# Patient Record
Sex: MALE | Race: OTHER | HISPANIC OR LATINO | Employment: FULL TIME | URBAN - NONMETROPOLITAN AREA
[De-identification: names, ages, dates, MRNs, and addresses within clinical notes are randomized per-mention and may not be internally consistent; named-entity substitution may affect disease eponyms.]

---

## 2019-01-16 ENCOUNTER — APPOINTMENT (EMERGENCY)
Dept: CT IMAGING | Facility: HOSPITAL | Age: 34
End: 2019-01-16
Payer: COMMERCIAL

## 2019-01-16 ENCOUNTER — APPOINTMENT (EMERGENCY)
Dept: RADIOLOGY | Facility: HOSPITAL | Age: 34
End: 2019-01-16
Payer: COMMERCIAL

## 2019-01-16 ENCOUNTER — HOSPITAL ENCOUNTER (EMERGENCY)
Facility: HOSPITAL | Age: 34
Discharge: HOME | End: 2019-01-16
Attending: EMERGENCY MEDICINE
Payer: COMMERCIAL

## 2019-01-16 VITALS
RESPIRATION RATE: 18 BRPM | DIASTOLIC BLOOD PRESSURE: 74 MMHG | HEART RATE: 72 BPM | SYSTOLIC BLOOD PRESSURE: 124 MMHG | OXYGEN SATURATION: 97 % | TEMPERATURE: 99.2 F | WEIGHT: 205.25 LBS

## 2019-01-16 DIAGNOSIS — E87.6 HYPOKALEMIA: Primary | ICD-10-CM

## 2019-01-16 DIAGNOSIS — R20.2 PARESTHESIAS: ICD-10-CM

## 2019-01-16 LAB
ALBUMIN SERPL BCP-MCNC: 4 G/DL (ref 3.5–5)
ALP SERPL-CCNC: 138 U/L (ref 46–116)
ALT SERPL W P-5'-P-CCNC: 96 U/L (ref 12–78)
ANION GAP SERPL CALCULATED.3IONS-SCNC: 15 MMOL/L (ref 4–13)
AST SERPL W P-5'-P-CCNC: 56 U/L (ref 5–45)
BASOPHILS # BLD AUTO: 0.02 THOUSANDS/ΜL (ref 0–0.1)
BASOPHILS NFR BLD AUTO: 0 % (ref 0–1)
BILIRUB SERPL-MCNC: 1 MG/DL (ref 0.2–1)
BUN SERPL-MCNC: 12 MG/DL (ref 5–25)
CALCIUM SERPL-MCNC: 9 MG/DL (ref 8.3–10.1)
CHLORIDE SERPL-SCNC: 104 MMOL/L (ref 100–108)
CO2 SERPL-SCNC: 22 MMOL/L (ref 21–32)
CREAT SERPL-MCNC: 1.07 MG/DL (ref 0.6–1.3)
EOSINOPHIL # BLD AUTO: 0.11 THOUSAND/ΜL (ref 0–0.61)
EOSINOPHIL NFR BLD AUTO: 2 % (ref 0–6)
ERYTHROCYTE [DISTWIDTH] IN BLOOD BY AUTOMATED COUNT: 12.3 % (ref 11.6–15.1)
GAS + CO PNL BLDA: 1.4 % (ref 0–1.5)
GFR SERPL CREATININE-BSD FRML MDRD: 91 ML/MIN/1.73SQ M
GLUCOSE SERPL-MCNC: 134 MG/DL (ref 65–140)
HCT VFR BLD AUTO: 43.8 % (ref 36.5–49.3)
HGB BLD-MCNC: 15.4 G/DL (ref 12–17)
IMM GRANULOCYTES # BLD AUTO: 0.02 THOUSAND/UL (ref 0–0.2)
IMM GRANULOCYTES NFR BLD AUTO: 0 % (ref 0–2)
LYMPHOCYTES # BLD AUTO: 2.1 THOUSANDS/ΜL (ref 0.6–4.47)
LYMPHOCYTES NFR BLD AUTO: 36 % (ref 14–44)
MAGNESIUM SERPL-MCNC: 1.9 MG/DL (ref 1.6–2.6)
MCH RBC QN AUTO: 30.3 PG (ref 26.8–34.3)
MCHC RBC AUTO-ENTMCNC: 35.2 G/DL (ref 31.4–37.4)
MCV RBC AUTO: 86 FL (ref 82–98)
MONOCYTES # BLD AUTO: 0.32 THOUSAND/ΜL (ref 0.17–1.22)
MONOCYTES NFR BLD AUTO: 6 % (ref 4–12)
NEUTROPHILS # BLD AUTO: 3.22 THOUSANDS/ΜL (ref 1.85–7.62)
NEUTS SEG NFR BLD AUTO: 56 % (ref 43–75)
NRBC BLD AUTO-RTO: 0 /100 WBCS
PLATELET # BLD AUTO: 249 THOUSANDS/UL (ref 149–390)
PMV BLD AUTO: 9.7 FL (ref 8.9–12.7)
POTASSIUM SERPL-SCNC: 3.2 MMOL/L (ref 3.5–5.3)
PROT SERPL-MCNC: 7.7 G/DL (ref 6.4–8.2)
RBC # BLD AUTO: 5.08 MILLION/UL (ref 3.88–5.62)
SODIUM SERPL-SCNC: 141 MMOL/L (ref 136–145)
TROPONIN I SERPL-MCNC: <0.02 NG/ML
TSH SERPL DL<=0.05 MIU/L-ACNC: 0.84 UIU/ML (ref 0.36–3.74)
WBC # BLD AUTO: 5.79 THOUSAND/UL (ref 4.31–10.16)

## 2019-01-16 PROCEDURE — 71045 X-RAY EXAM CHEST 1 VIEW: CPT

## 2019-01-16 PROCEDURE — 83735 ASSAY OF MAGNESIUM: CPT | Performed by: PHYSICIAN ASSISTANT

## 2019-01-16 PROCEDURE — 85025 COMPLETE CBC W/AUTO DIFF WBC: CPT | Performed by: PHYSICIAN ASSISTANT

## 2019-01-16 PROCEDURE — 96360 HYDRATION IV INFUSION INIT: CPT

## 2019-01-16 PROCEDURE — 82375 ASSAY CARBOXYHB QUANT: CPT | Performed by: PHYSICIAN ASSISTANT

## 2019-01-16 PROCEDURE — 93005 ELECTROCARDIOGRAM TRACING: CPT

## 2019-01-16 PROCEDURE — 70450 CT HEAD/BRAIN W/O DYE: CPT

## 2019-01-16 PROCEDURE — 84484 ASSAY OF TROPONIN QUANT: CPT | Performed by: PHYSICIAN ASSISTANT

## 2019-01-16 PROCEDURE — 36415 COLL VENOUS BLD VENIPUNCTURE: CPT | Performed by: PHYSICIAN ASSISTANT

## 2019-01-16 PROCEDURE — 84443 ASSAY THYROID STIM HORMONE: CPT | Performed by: PHYSICIAN ASSISTANT

## 2019-01-16 PROCEDURE — 80053 COMPREHEN METABOLIC PANEL: CPT | Performed by: PHYSICIAN ASSISTANT

## 2019-01-16 PROCEDURE — 99285 EMERGENCY DEPT VISIT HI MDM: CPT

## 2019-01-16 RX ORDER — POTASSIUM CHLORIDE 20 MEQ/1
40 TABLET, EXTENDED RELEASE ORAL ONCE
Status: COMPLETED | OUTPATIENT
Start: 2019-01-16 | End: 2019-01-16

## 2019-01-16 RX ADMIN — POTASSIUM CHLORIDE 40 MEQ: 1500 TABLET, EXTENDED RELEASE ORAL at 20:26

## 2019-01-16 RX ADMIN — SODIUM CHLORIDE 1000 ML: 0.9 INJECTION, SOLUTION INTRAVENOUS at 18:36

## 2019-01-16 NOTE — ED PROCEDURE NOTE
Procedure  ECG 12 Lead Documentation  Date/Time: 1/16/2019 6:56 PM  Performed by: Sandra Mann by: Regla Kirby     Indications / Diagnosis:  Numbness  ECG reviewed by me, the ED Provider: yes    Patient location:  ED  Previous ECG:     Previous ECG:  Unavailable    Comparison to cardiac monitor: Yes    Interpretation:     Interpretation: non-specific    Rate:     ECG rate:  72    ECG rate assessment: normal    Rhythm:     Rhythm: sinus rhythm    Ectopy:     Ectopy: none    QRS:     QRS axis:  Normal    QRS intervals:  Normal  Conduction:     Conduction: normal    ST segments:     ST segments:  Normal                     Kenan Cruz PA-C  01/16/19 1857

## 2019-01-16 NOTE — ED PROVIDER NOTES
History  Chief Complaint   Patient presents with    Numbness     Patient states numbness in both arms since he tried Meth for the first time on Sunday 1/13/19  Patient presents to the emergency department today [facility]  He is alert orient x4  Chief complaint is bilateral upper extremity numbness and tingling into the left chest   Also complains of some left leg numbness and tingling but none on the right  Other than that he denies any symptomatology  He states the symptoms have been present ever since he utilized intranasal methamphetamines for the 1st time ever 3 days ago  No history of headache  No blurred vision or double vision  No chest pain or shortness of breath  Denies use of any other illicit drugs  None       History reviewed  No pertinent past medical history  History reviewed  No pertinent surgical history  History reviewed  No pertinent family history  I have reviewed and agree with the history as documented  Social History   Substance Use Topics    Smoking status: Former Smoker    Smokeless tobacco: Never Used    Alcohol use Yes      Comment: Weekends        Review of Systems   Constitutional: Negative  HENT: Negative  Eyes: Negative  Respiratory: Negative  Cardiovascular: Negative  Gastrointestinal: Negative  Endocrine: Negative  Genitourinary: Negative  Musculoskeletal: Negative  Skin: Negative  Allergic/Immunologic: Negative  Neurological: Positive for numbness  Negative for dizziness, tremors, seizures, syncope, facial asymmetry, speech difficulty, weakness, light-headedness and headaches  Hematological: Negative  Psychiatric/Behavioral: Negative  All other systems reviewed and are negative  Physical Exam  Physical Exam   Constitutional: He is oriented to person, place, and time  He appears well-developed and well-nourished  No distress  HENT:   Head: Normocephalic  Right Ear: External ear normal    Left Ear: External ear normal    Nose: Nose normal    Mouth/Throat: Oropharynx is clear and moist  No oropharyngeal exudate  Eyes: Pupils are equal, round, and reactive to light  Neck: Normal range of motion  Cardiovascular: Normal rate, regular rhythm, normal heart sounds and intact distal pulses  Exam reveals no gallop and no friction rub  No murmur heard  Pulmonary/Chest: Effort normal and breath sounds normal  No respiratory distress  He has no wheezes  He has no rales  He exhibits no tenderness  Abdominal: Soft  He exhibits no distension and no mass  There is no tenderness  There is no rebound and no guarding  No hernia  Musculoskeletal: Normal range of motion  Neurological: He is alert and oriented to person, place, and time  He has normal strength  He is not disoriented  He displays no atrophy, no tremor and normal reflexes  No cranial nerve deficit or sensory deficit  He exhibits normal muscle tone  He displays no seizure activity  Coordination normal  GCS eye subscore is 4  GCS verbal subscore is 5  GCS motor subscore is 6  Skin: Skin is warm  Capillary refill takes less than 2 seconds  He is not diaphoretic  Vitals reviewed        Vital Signs  ED Triage Vitals [01/16/19 1813]   Temperature Pulse Respirations Blood Pressure SpO2   99 2 °F (37 3 °C) 79 16 136/86 100 %      Temp Source Heart Rate Source Patient Position - Orthostatic VS BP Location FiO2 (%)   Temporal Monitor Sitting Right arm --      Pain Score       No Pain           Vitals:    01/16/19 1813 01/16/19 2000 01/16/19 2030   BP: 136/86  114/67   Pulse: 79 64 73   Patient Position - Orthostatic VS: Sitting  Lying       Visual Acuity  Visual Acuity      Most Recent Value   L Pupil Size (mm)  4   R Pupil Size (mm)  4          ED Medications  Medications   sodium chloride 0 9 % bolus 1,000 mL (1,000 mL Intravenous New Bag 1/16/19 1836)   potassium chloride (K-DUR,KLOR-CON) CR tablet 40 mEq (40 mEq Oral Given 1/16/19 2026)       Diagnostic Studies  Results Reviewed     Procedure Component Value Units Date/Time    Dickens draw [[id number]] Collected:  01/16/19 1836    Lab Status:  Final result Specimen:  Blood Updated:  01/16/19 2002    Narrative: The following orders were created for panel order Dickens draw  Procedure                               Abnormality         Status                     ---------                               -----------         ------                     Jonna Romero Top on [id number]]                           Final result               Gold top on SZNJ[[id number]]                                 Final result                 Please view results for these tests on the individual orders  Comprehensive metabolic panel [[id number]]  (Abnormal) Collected:  01/16/19 1836    Lab Status:  Final result Specimen:  Blood from Arm, Right Updated:  01/16/19 1927     Sodium 141 mmol/L      Potassium 3 2 (L) mmol/L      Chloride 104 mmol/L      CO2 22 mmol/L      ANION GAP 15 (H) mmol/L      BUN 12 mg/dL      Creatinine 1 07 mg/dL      Glucose 134 mg/dL      Calcium 9 0 mg/dL      AST 56 (H) U/L      ALT 96 (H) U/L      Alkaline Phosphatase 138 (H) U/L      Total Protein 7 7 g/dL      Albumin 4 0 g/dL      Total Bilirubin 1 00 mg/dL      eGFR 91 ml/min/1 73sq m     Narrative:         National Kidney Disease Education Program recommendations are as follows:  GFR calculation is accurate only with a steady state creatinine  Chronic Kidney disease less than 60 ml/min/1 73 sq  meters  Kidney failure less than 15 ml/min/1 73 sq  meters  TSH [[id number]]  (Normal) Collected:  01/16/19 1836    Lab Status:  Final result Specimen:  Blood from Arm, Right Updated:  01/16/19 1927     TSH 3RD GENERATON 0 837 uIU/mL     Narrative:         Patients undergoing fluorescein dye angiography may retain small amounts of fluorescein in the body for 48-72 hours post procedure   Samples containing fluorescein can produce falsely depressed TSH values  If the patient had this procedure,a specimen should be resubmitted post fluorescein clearance  Magnesium [[id number]]  (Normal) Collected:  01/16/19 1836    Lab Status:  Final result Specimen:  Blood from Arm, Right Updated:  01/16/19 1927     Magnesium 1 9 mg/dL     Troponin I [[id number]]  (Normal) Collected:  01/16/19 1836    Lab Status:  Final result Specimen:  Blood from Arm, Right Updated:  01/16/19 1917     Troponin I <0 02 ng/mL     Carboxyhemoglobin [[id number]]  (Normal) Collected:  01/16/19 1836    Lab Status:  Final result Specimen:  Blood from Arm, Right Updated:  01/16/19 1850     Carbon Monoxide, Blood 1 4 %     Narrative:        Therapeutic levels (1 mg/mL and 2 mg/mL) of hydroxocobalamin may interfere with the fCOHb and fMetHb where it may cause lower than expected values  Normal Carboxyhemoglobin range for nonsmokers is <1 5%   Normal Carboxyhemoglobin range for smokers is 1 5% to 5 1%     CBC and differential [[id number]] Collected:  01/16/19 1836    Lab Status:  Final result Specimen:  Blood from Arm, Right Updated:  01/16/19 1845     WBC 5 79 Thousand/uL      RBC 5 08 Million/uL      Hemoglobin 15 4 g/dL      Hematocrit 43 8 %      MCV 86 fL      MCH 30 3 pg      MCHC 35 2 g/dL      RDW 12 3 %      MPV 9 7 fL      Platelets 620 Thousands/uL      nRBC 0 /100 WBCs      Neutrophils Relative 56 %      Immat GRANS % 0 %      Lymphocytes Relative 36 %      Monocytes Relative 6 %      Eosinophils Relative 2 %      Basophils Relative 0 %      Neutrophils Absolute 3 22 Thousands/µL      Immature Grans Absolute 0 02 Thousand/uL      Lymphocytes Absolute 2 10 Thousands/µL      Monocytes Absolute 0 32 Thousand/µL      Eosinophils Absolute 0 11 Thousand/µL      Basophils Absolute 0 02 Thousands/µL                  XR chest 1 view portable   ED Interpretation by Davy Meyers PA-C (01/16 1919)   No evidence of acute cardiopulmonary disease      Final Result by Andi Crews Candie Beck MD (01/16 2050)      No acute cardiopulmonary disease  This report is in agreement with the preliminary interpretation  Workstation performed: [id number]         CT head without contrast   Final Result by Celestina Krishna MD (01/16 1919)      No acute intracranial abnormality  Workstation performed: [id number]                    Procedures  Procedures       Phone Contacts  ED Phone Contact    ED Course  ED Course as of Jan 16 2052 Wed Jan 16, 2019   1853 Carbon Monoxide, Blood: 1 4   1853 WBC: 5 79   1853 Hemoglobin: 15 4   1853 Platelet Count: 143   1918 Troponin I: <0 02   1919 Awaiting CT head    1933 TSH 3RD GENERATON: 0 837   1933 Magnesium: 1 9   1934 FINDINGS:    PARENCHYMA:  No intracranial mass, mass effect or midline shift  No CT signs of acute infarction   No acute parenchymal hemorrhage  VENTRICLES AND EXTRA-AXIAL SPACES:  Normal for the patient's age  VISUALIZED ORBITS AND PARANASAL SINUSES:  No acute abnormality involving the orbits   Mild scattered sinus mucosal thickening is noted   No fluid levels are seen  CALVARIUM AND EXTRACRANIAL SOFT TISSUES:  Plain screw fixation noted at the left lateral and inferior orbital walls and anterior maxillary sinus  Impression       No acute intracranial abnormality  2048 Patient wasRe-evaluated again after he was given potassium  He states he is feeling better  It is of note that patient states he just remembered that he has a history of having low potassium levels  He had a gallbladder surgery about 2 months ago was told that he needs potassium replacement  He did not get hooked up with primary care yet however states he does not he will he will follow at home  Wishes to be discharged  Scans potassium rich foods and extremely close follow-up with primary care for repeat blood work            HEART Risk Score      Most Recent Value   History  0 Filed at: 01/16/2019 1857   ECG  0 Filed at: 01/16/2019 1857 Age  0 Filed at: 01/16/2019 1857   Risk Factors  0 Filed at: 01/16/2019 1857   Troponin  0 Filed at: 01/16/2019 1857   Heart Score Risk Calculator   History  0 Filed at: 01/16/2019 1857   ECG  0 Filed at: 01/16/2019 1857   Age  0 Filed at: 01/16/2019 1857   Risk Factors  0 Filed at: 01/16/2019 1857   Troponin  0 Filed at: 01/16/2019 1857   HEART Score  0 Filed at: 01/16/2019 1857   HEART Score  0 Filed at: 01/16/2019 [facility]                Stroke Assessment     Row Name 01/16/19 1840             NIH Stroke Scale    Interval Baseline      Level of Consciousness (1a ) 0      LOC Questions (1b ) 0      LOC Commands (1c ) 0      Best Gaze (2 ) 0      Visual (3 ) 0      Facial Palsy (4 ) 0      Motor Arm, Left (5a ) 0      Motor Arm, Right (5b ) 0      Motor Leg, Left (6a ) 0      Motor Leg, Right (6b ) 0      Limb Ataxia (7 ) 0      Sensory (8 ) 0      Best Language (9 ) 0      Dysarthria (10 ) 0      Extinction and Inattention (11 ) (Formerly Neglect) 0      Total 0                          MDM  CritCare Time    Disposition  Final diagnoses:   Hypokalemia   Paresthesias     Time reflects when diagnosis was documented in both MDM as applicable and the Disposition within this note     Time User Action Codes Description Comment    1/16/2019  8:49 PM Alena Minors Add [E87 6] Hypokalemia     1/16/2019  8:49 PM Alena Minors Add [R20 2] Paresthesias       ED Disposition     ED Disposition Condition Comment    Discharge  Jacinta Horner discharge to home/self care  Condition at discharge: Good        Follow-up Information     Follow up With Specialties Details Why Contact Info    PCP  Schedule an appointment as soon as possible for a visit            Patient's Medications    No medications on file     No discharge procedures on file      ED Provider  Electronically Signed by           Isha Hubbard PA-C  01/16/19 2052

## 2019-01-17 LAB
ATRIAL RATE: 72 BPM
P AXIS: 52 DEGREES
PR INTERVAL: 158 MS
QRS AXIS: 22 DEGREES
QRSD INTERVAL: 100 MS
QT INTERVAL: 378 MS
QTC INTERVAL: 413 MS
T WAVE AXIS: 6 DEGREES
VENTRICULAR RATE: 72 BPM

## 2019-01-17 PROCEDURE — 93010 ELECTROCARDIOGRAM REPORT: CPT | Performed by: INTERNAL MEDICINE

## 2019-01-17 NOTE — DISCHARGE INSTRUCTIONS
Hypokalemia   WHAT YOU NEED TO KNOW:   Hypokalemia is a low level of potassium in your blood  Potassium helps control how your muscles, heart, and digestive system work  Hypokalemia occurs when your body loses too much potassium or does not absorb enough from food  DISCHARGE INSTRUCTIONS:   Return to the emergency department if:   · You cannot move your arm or leg  · You have a fast or irregular heartbeat  · You are too tired or weak to stand up  Contact your healthcare provider if:   · You are vomiting, or you have diarrhea  · You have numbness or tingling in your arms or legs  · Your symptoms do not go away or they get worse  · You have questions or concerns about your condition or care  Medicines:   · Potassium  will be given to bring your potassium levels back to normal     · Take your medicine as directed  Contact your healthcare provider if you think your medicine is not helping or if you have side effects  Tell him of her if you are allergic to any medicine  Keep a list of the medicines, vitamins, and herbs you take  Include the amounts, and when and why you take them  Bring the list or the pill bottles to follow-up visits  Carry your medicine list with you in case of an emergency  Eat foods that are high in potassium:  Foods that are high in potassium include bananas, oranges, tomatoes, potatoes, and avocado  Davila beans, turkey, salmon, lean beef, yogurt, and milk are also high in potassium  Ask your healthcare provider or dietitian for more information about foods that are high in potassium  Follow up with your healthcare provider as directed:  Write down your questions so you remember to ask them during your visits  © 2017 [employer]  Information is for End User's use only and may not be sold, redistributed or otherwise used for commercial purposes   All illustrations and images included in CareNotes® are the copyrighted property of [employer]  or [employer]  The above information is an  only  It is not intended as medical advice for individual conditions or treatments  Talk to your doctor, nurse or pharmacist before following any medical regimen to see if it is safe and effective for you  It is extremely important to get follow up with primary care for repeat potassium levels